# Patient Record
Sex: MALE | Race: WHITE | NOT HISPANIC OR LATINO
[De-identification: names, ages, dates, MRNs, and addresses within clinical notes are randomized per-mention and may not be internally consistent; named-entity substitution may affect disease eponyms.]

---

## 2018-05-02 ENCOUNTER — TRANSCRIPTION ENCOUNTER (OUTPATIENT)
Age: 58
End: 2018-05-02

## 2019-04-19 PROBLEM — Z00.00 ENCOUNTER FOR PREVENTIVE HEALTH EXAMINATION: Status: ACTIVE | Noted: 2019-04-19

## 2019-04-24 ENCOUNTER — RECORD ABSTRACTING (OUTPATIENT)
Age: 59
End: 2019-04-24

## 2019-04-26 ENCOUNTER — APPOINTMENT (OUTPATIENT)
Dept: CARDIOLOGY | Facility: CLINIC | Age: 59
End: 2019-04-26
Payer: COMMERCIAL

## 2019-04-26 ENCOUNTER — NON-APPOINTMENT (OUTPATIENT)
Age: 59
End: 2019-04-26

## 2019-04-26 VITALS
SYSTOLIC BLOOD PRESSURE: 120 MMHG | OXYGEN SATURATION: 98 % | DIASTOLIC BLOOD PRESSURE: 80 MMHG | HEART RATE: 74 BPM | BODY MASS INDEX: 30.48 KG/M2 | WEIGHT: 230 LBS | HEIGHT: 73 IN

## 2019-04-26 DIAGNOSIS — Z78.9 OTHER SPECIFIED HEALTH STATUS: ICD-10-CM

## 2019-04-26 DIAGNOSIS — Z86.39 PERSONAL HISTORY OF OTHER ENDOCRINE, NUTRITIONAL AND METABOLIC DISEASE: ICD-10-CM

## 2019-04-26 DIAGNOSIS — Z87.19 PERSONAL HISTORY OF OTHER DISEASES OF THE DIGESTIVE SYSTEM: ICD-10-CM

## 2019-04-26 PROCEDURE — 99243 OFF/OP CNSLTJ NEW/EST LOW 30: CPT

## 2019-04-26 PROCEDURE — 93000 ELECTROCARDIOGRAM COMPLETE: CPT

## 2019-04-26 NOTE — REASON FOR VISIT
[Consultation] : a consultation regarding [FreeTextEntry2] : [facility] ER evaluation for hypertension and neck pain.  [FreeTextEntry1] : Alexei is a 59-year-old male with history of hypertension, obesity, dyslipidemia, erectile dysfunction, family history of premature CAD.\par \par Patient had elevated /105 and neck pain evaluated [facility] 4/8/19, normal labs, nonischemic EKG,  CT neck left thyroid nodule following up with endocrine.  Patient discharged for cardiology followup.\par \par Patient has dyspnea with moderate exertion. Cardiovascular review of symptoms is negative for exertional chest pain, dyspnea, palpitations, dizziness or syncope.  No PND or orthopnea leg edema.  No bleeding or black stool.  Patient has mild right neck pain "fullness" currently under evaluation with endocrinologist for left thyroid nodule. \par  \par Patient is not exercising.  Patient is walking 10 minutes with exertional chest pain.\par \par EKG April 2019, sinus rhythm NSST\par \par Labs [facility] 4/8/19 normal BMP, TSH, LFTs, magnesium, hemoglobin 18\par \par Noncontrast CT neck 4/8/19 left inferior thyroid hypodense nodule\par \par Adenosine Myoview stress test 4/23/12 normal LVEF, normal perfusion, baseline sinus rhythm, no chest pain\par \par Echocardiogram 4/20/12 LVEF 55% normal diastolic function, trace MR, aortic arch 3.0cm

## 2019-04-26 NOTE — DISCUSSION/SUMMARY
[FreeTextEntry1] : Alexei is a 59-year-old male with medical history detailed above and active medical issues including:\par \par - Dyspnea on exertion, neck pain/fullness, multiple CAD risk factors. Patient will have noninvasive testing with a exercise Myoview stress test to assess for obstructive CAD, exercise-induced arrhythmia,  blood pressure response, 2DEcho for LVEF, wall motion, structural heart disease,  carotid and abdominal ultrasound to assess for obstructive PAD. \par \par - Labile hypertension at BP goal less than 130/80 on Accuretic and diastolic\par \par - Dyslipidemia on Lovasa\par \par - Erectile dysfunction on as needed Viagra\par \par - Reflux GERD on omeprazole\par \par - Left thyroid nodule evaluation with endocrinologist\par \par - Family history of premature CAD\par \par Patient will be seen in cardiology follow-up after noninvasive testing.\par \par Advised patient to follow active lifestyle with regular cardiovascular exercise. Patient educated on lifestyle and diet modification with low sodium low fat diet and avoidance of excessive alcohol. Patient is aware to call with any symptoms or concerns. \par \par Alexei will followup with Dr Puneet Guzman for primary care\par

## 2019-04-26 NOTE — PHYSICAL EXAM
[General Appearance - Well Developed] : well developed [Normal Appearance] : normal appearance [Well Groomed] : well groomed [General Appearance - Well Nourished] : well nourished [No Deformities] : no deformities [General Appearance - In No Acute Distress] : no acute distress [Normal Conjunctiva] : the conjunctiva exhibited no abnormalities [Eyelids - No Xanthelasma] : the eyelids demonstrated no xanthelasmas [Normal Oral Mucosa] : normal oral mucosa [No Oral Pallor] : no oral pallor [No Oral Cyanosis] : no oral cyanosis [Normal Jugular Venous A Waves Present] : normal jugular venous A waves present [Normal Jugular Venous V Waves Present] : normal jugular venous V waves present [No Jugular Venous Cassidy A Waves] : no jugular venous cassidy A waves [Heart Rate And Rhythm] : heart rate and rhythm were normal [Heart Sounds] : normal S1 and S2 [Murmurs] : no murmurs present [Respiration, Rhythm And Depth] : normal respiratory rhythm and effort [Exaggerated Use Of Accessory Muscles For Inspiration] : no accessory muscle use [Auscultation Breath Sounds / Voice Sounds] : lungs were clear to auscultation bilaterally [Abdomen Tenderness] : non-tender [Abdomen Soft] : soft [Abdomen Mass (___ Cm)] : no abdominal mass palpated [Abnormal Walk] : normal gait [Gait - Sufficient For Exercise Testing] : the gait was sufficient for exercise testing [Nail Clubbing] : no clubbing of the fingernails [Cyanosis, Localized] : no localized cyanosis [Petechial Hemorrhages (___cm)] : no petechial hemorrhages [Skin Color & Pigmentation] : normal skin color and pigmentation [] : no rash [No Venous Stasis] : no venous stasis [Skin Lesions] : no skin lesions [No Skin Ulcers] : no skin ulcer [No Xanthoma] : no  xanthoma was observed [Affect] : the affect was normal [Oriented To Time, Place, And Person] : oriented to person, place, and time [Mood] : the mood was normal [No Anxiety] : not feeling anxious [FreeTextEntry1] : mild right neck fullness

## 2019-05-29 ENCOUNTER — APPOINTMENT (OUTPATIENT)
Dept: CARDIOLOGY | Facility: CLINIC | Age: 59
End: 2019-05-29

## 2019-05-31 ENCOUNTER — APPOINTMENT (OUTPATIENT)
Dept: CARDIOLOGY | Facility: CLINIC | Age: 59
End: 2019-05-31

## 2019-06-06 ENCOUNTER — APPOINTMENT (OUTPATIENT)
Dept: CARDIOLOGY | Facility: CLINIC | Age: 59
End: 2019-06-06
Payer: COMMERCIAL

## 2019-06-06 PROCEDURE — 93880 EXTRACRANIAL BILAT STUDY: CPT

## 2019-06-06 PROCEDURE — 93979 VASCULAR STUDY: CPT

## 2019-06-06 PROCEDURE — 93306 TTE W/DOPPLER COMPLETE: CPT

## 2019-06-06 RX ORDER — OMEPRAZOLE 20 MG/1
20 CAPSULE, DELAYED RELEASE ORAL DAILY
Refills: 0 | Status: ACTIVE | COMMUNITY

## 2019-06-07 ENCOUNTER — APPOINTMENT (OUTPATIENT)
Dept: CARDIOLOGY | Facility: CLINIC | Age: 59
End: 2019-06-07
Payer: COMMERCIAL

## 2019-06-07 PROCEDURE — 93015 CV STRESS TEST SUPVJ I&R: CPT

## 2019-06-07 PROCEDURE — A9502: CPT

## 2019-06-07 PROCEDURE — 78452 HT MUSCLE IMAGE SPECT MULT: CPT

## 2019-06-21 ENCOUNTER — APPOINTMENT (OUTPATIENT)
Dept: CARDIOLOGY | Facility: CLINIC | Age: 59
End: 2019-06-21
Payer: COMMERCIAL

## 2019-06-21 VITALS
DIASTOLIC BLOOD PRESSURE: 80 MMHG | HEIGHT: 73 IN | SYSTOLIC BLOOD PRESSURE: 140 MMHG | BODY MASS INDEX: 30.48 KG/M2 | OXYGEN SATURATION: 98 % | WEIGHT: 230 LBS | HEART RATE: 76 BPM

## 2019-06-21 PROCEDURE — 99214 OFFICE O/P EST MOD 30 MIN: CPT

## 2019-06-21 NOTE — PHYSICAL EXAM
[General Appearance - Well Developed] : well developed [Normal Appearance] : normal appearance [Well Groomed] : well groomed [General Appearance - Well Nourished] : well nourished [No Deformities] : no deformities [General Appearance - In No Acute Distress] : no acute distress [Normal Conjunctiva] : the conjunctiva exhibited no abnormalities [Eyelids - No Xanthelasma] : the eyelids demonstrated no xanthelasmas [Normal Oral Mucosa] : normal oral mucosa [No Oral Pallor] : no oral pallor [No Oral Cyanosis] : no oral cyanosis [Normal Jugular Venous A Waves Present] : normal jugular venous A waves present [Normal Jugular Venous V Waves Present] : normal jugular venous V waves present [No Jugular Venous Cassidy A Waves] : no jugular venous cassidy A waves [Respiration, Rhythm And Depth] : normal respiratory rhythm and effort [Exaggerated Use Of Accessory Muscles For Inspiration] : no accessory muscle use [Auscultation Breath Sounds / Voice Sounds] : lungs were clear to auscultation bilaterally [Heart Sounds] : normal S1 and S2 [Heart Rate And Rhythm] : heart rate and rhythm were normal [Murmurs] : no murmurs present [Abdomen Soft] : soft [Abdomen Tenderness] : non-tender [Abdomen Mass (___ Cm)] : no abdominal mass palpated [Gait - Sufficient For Exercise Testing] : the gait was sufficient for exercise testing [Abnormal Walk] : normal gait [Nail Clubbing] : no clubbing of the fingernails [Cyanosis, Localized] : no localized cyanosis [Petechial Hemorrhages (___cm)] : no petechial hemorrhages [Skin Color & Pigmentation] : normal skin color and pigmentation [No Venous Stasis] : no venous stasis [] : no rash [Skin Lesions] : no skin lesions [No Skin Ulcers] : no skin ulcer [No Xanthoma] : no  xanthoma was observed [Oriented To Time, Place, And Person] : oriented to person, place, and time [Mood] : the mood was normal [Affect] : the affect was normal [No Anxiety] : not feeling anxious [FreeTextEntry1] : mild right neck fullness

## 2019-06-21 NOTE — DISCUSSION/SUMMARY
[FreeTextEntry1] : Alexei is a 59-year-old male with medical history detailed above and active medical issues including:\par \par - Patient is seen for preop colonoscopy Dr Reese 6/26/19 [facility] and umbilical hernia repair Dr Brian 7/12/19 [facility] [place] [facility]. Patient is optimized from a cardiovascular perspective and may proceed with surgery.  Patient currently not on anticoagulation or aspirin therapy.  Patient should continue antihypertensive medication Accuretic and Bystolic up to the time of surgery. Please call with any further questions. \par \par - No anginal symptoms, normal perfusion Myoview stress test with normal LVEF June 2019\par \par - Labile hypertension at BP goal less than 130/80 on Accuretic and Bystolic\par \par - Dyslipidemia on Lovasa well  tolerated\par \par - Erectile dysfunction on as needed Viagra\par \par - Reflux GERD on omeprazole\par \par - Left thyroid nodule evaluation with endocrinologist\par \par - Family history of premature CAD\par \par Patient will be seen in cardiology follow-up 6 months. Current cardiac medications remain unchanged. Repeat labs will be ordered with PMD.\par \par Advised patient to follow active lifestyle with regular cardiovascular exercise. Patient educated on lifestyle and diet modification with low sodium low fat diet and avoidance of excessive alcohol. Patient is aware to call with any symptoms or concerns. \par \par Alexei will followup with Dr Puneet Guzman for primary care\par

## 2019-06-21 NOTE — REASON FOR VISIT
[Consultation] : a consultation regarding [FreeTextEntry1] : Alexei is a 59-year-old male with history of hypertension, obesity, dyslipidemia, erectile dysfunction, family history of premature CAD.\par \par Cardiovascular review of symptoms is negative for exertional chest pain, dyspnea, palpitations, dizziness or syncope.  No PND or orthopnea leg edema.  No bleeding or black stool.\par \par Patient is walking 15 minutes with exertional chest pain. \par \par Patient had elevated /105 and neck pain evaluated [facility] 4/8/19, normal labs, nonischemic EKG,  CT neck left thyroid nodule following up with endocrine.  Patient discharged for cardiology followup.\par \par Lexascan Myoview stress test June 2019 LVEF 58%, normal perfusion, diaphragm attenuation seen, no ischemic EKG response, no chest pain, baseline sinus rhythm T-wave inversion V3\par \par Echocardiogram June 2019, LVEF 53%, mild diastolic dysfunction, mild MR and TR, normal RVSP\par \par Carotid and abdominal ultrasound June 2019 mild nonobstructive plaque,\par \par EKG April 2019, sinus rhythm NSST\par \par Labs [facility] 4/8/19 normal BMP, TSH, LFTs, magnesium, hemoglobin 18\par \par Noncontrast CT neck 4/8/19 left inferior thyroid hypodense nodule\par \par Adenosine Myoview stress test 4/23/12 normal LVEF, normal perfusion, baseline sinus rhythm, no chest pain\par \par Echocardiogram 4/20/12 LVEF 55% normal diastolic function, trace MR, aortic arch 3.0cm [FreeTextEntry2] : preop colonoscopy Dr Reese 6/26/19 [facility] and umbilical hernia repair Dr Brian 7/12/19 [facility], noninvasive testing for LA

## 2019-06-28 ENCOUNTER — OUTPATIENT (OUTPATIENT)
Dept: OUTPATIENT SERVICES | Facility: HOSPITAL | Age: 59
LOS: 1 days | End: 2019-06-28

## 2019-07-12 ENCOUNTER — OUTPATIENT (OUTPATIENT)
Dept: OUTPATIENT SERVICES | Facility: HOSPITAL | Age: 59
LOS: 1 days | End: 2019-07-12

## 2019-10-22 ENCOUNTER — TRANSCRIPTION ENCOUNTER (OUTPATIENT)
Age: 59
End: 2019-10-22

## 2019-10-24 ENCOUNTER — TRANSCRIPTION ENCOUNTER (OUTPATIENT)
Age: 59
End: 2019-10-24

## 2020-01-12 ENCOUNTER — TRANSCRIPTION ENCOUNTER (OUTPATIENT)
Age: 60
End: 2020-01-12

## 2023-01-03 ENCOUNTER — APPOINTMENT (OUTPATIENT)
Dept: CARDIOLOGY | Facility: CLINIC | Age: 63
End: 2023-01-03
Payer: COMMERCIAL

## 2023-01-03 ENCOUNTER — NON-APPOINTMENT (OUTPATIENT)
Age: 63
End: 2023-01-03

## 2023-01-03 VITALS
HEIGHT: 73 IN | OXYGEN SATURATION: 98 % | DIASTOLIC BLOOD PRESSURE: 82 MMHG | TEMPERATURE: 98.2 F | HEART RATE: 72 BPM | BODY MASS INDEX: 30.62 KG/M2 | SYSTOLIC BLOOD PRESSURE: 128 MMHG | WEIGHT: 231 LBS

## 2023-01-03 PROCEDURE — 99204 OFFICE O/P NEW MOD 45 MIN: CPT

## 2023-01-03 NOTE — REASON FOR VISIT
[Other: ____] : [unfilled] [FreeTextEntry1] : Alexei is a 62-year-old male with history of hypertension, obesity, dyslipidemia, DM2, erectile dysfunction, family history of premature CAD.\par \par Patient has dyspnea with moderate exertion.  Cardiovascular review of symptoms is negative for exertional chest pain, palpitations, dizziness or syncope.  No PND or orthopnea leg edema.  No bleeding or black stool.\par \par No exercise routine.  Patient is walking 15 minutes on occasion.\par \par Patient had been on Accupril recently recalled and subsequently discontinued.  Patient will continue Bystolic 20 Mg daily, HCTZ 12.5 Mg daily with follow-up home BPs to confirm at guideline goal.\par \par Lexascan Myoview stress test June 2019 LVEF 58%, normal perfusion, diaphragm attenuation seen, no ischemic EKG response, no chest pain, baseline sinus rhythm T-wave inversion V3\par \par Echocardiogram June 2019, LVEF 53%, mild diastolic dysfunction, mild MR and TR, normal RVSP\par \par Carotid and abdominal ultrasound June 2019 mild nonobstructive plaque,\par \par EKG April 2019, sinus rhythm NSST\par \par Labs [facility] 4/8/19 normal BMP, TSH, LFTs, magnesium, hemoglobin 18\par \par Noncontrast CT neck 4/8/19 left inferior thyroid hypodense nodule\par \par Adenosine Myoview stress test 4/23/12 normal LVEF, normal perfusion, baseline sinus rhythm, no chest pain\par \par Echocardiogram 4/20/12 LVEF 55% normal diastolic function, trace MR, aortic arch 3.0cm

## 2023-01-03 NOTE — DISCUSSION/SUMMARY
[FreeTextEntry1] : Alexei is a 59-year-old male with medical history detailed above and active medical issues including:\par \par - Dyspnea on exertion, multiple CAD risk factors.  Coronary CTA and coronary calcium score ordered to access for obstructive CAD and risk stratification.\par \par - Labile hypertension. Patient had been on Accupril recently recalled and subsequently discontinued.  Patient will continue Bystolic 20 Mg daily, HCTZ 12.5 Mg daily with follow-up home BPs to confirm at guideline goal.\par \par - Dyslipidemia,  labs Dec 2022 on Lovasa statin started by PCP, repeat labs with PCP. \par \par - Erectile dysfunction on as needed Viagra\par \par - Reflux GERD on omeprazole\par \par - Left thyroid nodule evaluation with endocrinologist\par \par - Family history of premature CAD\par \par Patient will be seen in cardiology follow-up after noninvasive test. Current cardiac medications remain unchanged. Repeat labs will be ordered with PMD.\par \par Advised patient to follow active lifestyle with regular cardiovascular exercise. Patient educated on lifestyle and diet modification with low sodium low fat diet and avoidance of excessive alcohol. Patient is aware to call with any symptoms or concerns. \par \par Alexei will followup with Dr. Usha Addison for primary care\par

## 2023-01-03 NOTE — PHYSICAL EXAM
[General Appearance - Well Developed] : well developed [Normal Appearance] : normal appearance [Well Groomed] : well groomed [General Appearance - Well Nourished] : well nourished [No Deformities] : no deformities [General Appearance - In No Acute Distress] : no acute distress [Normal Conjunctiva] : the conjunctiva exhibited no abnormalities [Eyelids - No Xanthelasma] : the eyelids demonstrated no xanthelasmas [Normal Oral Mucosa] : normal oral mucosa [No Oral Pallor] : no oral pallor [No Oral Cyanosis] : no oral cyanosis [Normal Jugular Venous A Waves Present] : normal jugular venous A waves present [Normal Jugular Venous V Waves Present] : normal jugular venous V waves present [No Jugular Venous Cassidy A Waves] : no jugular venous cassidy A waves [FreeTextEntry1] : mild right neck fullness [Respiration, Rhythm And Depth] : normal respiratory rhythm and effort [Exaggerated Use Of Accessory Muscles For Inspiration] : no accessory muscle use [Auscultation Breath Sounds / Voice Sounds] : lungs were clear to auscultation bilaterally [Heart Rate And Rhythm] : heart rate and rhythm were normal [Heart Sounds] : normal S1 and S2 [Murmurs] : no murmurs present [Abdomen Soft] : soft [Abdomen Tenderness] : non-tender [Abdomen Mass (___ Cm)] : no abdominal mass palpated [Abnormal Walk] : normal gait [Gait - Sufficient For Exercise Testing] : the gait was sufficient for exercise testing [Nail Clubbing] : no clubbing of the fingernails [Cyanosis, Localized] : no localized cyanosis [Petechial Hemorrhages (___cm)] : no petechial hemorrhages [Skin Color & Pigmentation] : normal skin color and pigmentation [] : no rash [No Venous Stasis] : no venous stasis [Skin Lesions] : no skin lesions [No Skin Ulcers] : no skin ulcer [No Xanthoma] : no  xanthoma was observed [Oriented To Time, Place, And Person] : oriented to person, place, and time [Affect] : the affect was normal [Mood] : the mood was normal [No Anxiety] : not feeling anxious

## 2023-01-11 RX ORDER — HYDROCHLOROTHIAZIDE 25 MG/1
25 TABLET ORAL DAILY
Qty: 90 | Refills: 1 | Status: ACTIVE | COMMUNITY
Start: 1900-01-01 | End: 1900-01-01

## 2023-01-11 RX ORDER — QUINAPRIL AND HYDROCHLOROTHIAZIDE 12.5; 2 MG/1; MG/1
20-12.5 TABLET, FILM COATED ORAL DAILY
Qty: 60 | Refills: 0 | Status: DISCONTINUED | COMMUNITY
End: 2023-01-11

## 2023-01-12 ENCOUNTER — NON-APPOINTMENT (OUTPATIENT)
Age: 63
End: 2023-01-12

## 2023-01-17 ENCOUNTER — APPOINTMENT (OUTPATIENT)
Dept: CARDIOLOGY | Facility: CLINIC | Age: 63
End: 2023-01-17
Payer: COMMERCIAL

## 2023-01-17 ENCOUNTER — NON-APPOINTMENT (OUTPATIENT)
Age: 63
End: 2023-01-17

## 2023-01-17 PROCEDURE — 93979 VASCULAR STUDY: CPT

## 2023-01-17 PROCEDURE — 93880 EXTRACRANIAL BILAT STUDY: CPT

## 2023-01-17 PROCEDURE — 93306 TTE W/DOPPLER COMPLETE: CPT

## 2023-01-17 RX ORDER — NEBIVOLOL 20 MG/1
20 TABLET ORAL
Qty: 145 | Refills: 0 | Status: ACTIVE | COMMUNITY
Start: 1900-01-01 | End: 1900-01-01

## 2023-01-18 ENCOUNTER — APPOINTMENT (OUTPATIENT)
Dept: CARDIOLOGY | Facility: CLINIC | Age: 63
End: 2023-01-18
Payer: COMMERCIAL

## 2023-01-18 VITALS
DIASTOLIC BLOOD PRESSURE: 68 MMHG | SYSTOLIC BLOOD PRESSURE: 120 MMHG | HEIGHT: 73 IN | OXYGEN SATURATION: 98 % | TEMPERATURE: 98 F | WEIGHT: 230 LBS | BODY MASS INDEX: 30.48 KG/M2 | HEART RATE: 76 BPM

## 2023-01-18 PROCEDURE — 99215 OFFICE O/P EST HI 40 MIN: CPT

## 2023-01-18 RX ORDER — OMEGA-3-ACID ETHYL ESTERS 1 G/1
1 CAPSULE, LIQUID FILLED ORAL DAILY
Refills: 0 | Status: ACTIVE | COMMUNITY

## 2023-01-18 RX ORDER — ROSUVASTATIN CALCIUM 10 MG/1
10 TABLET, FILM COATED ORAL
Qty: 90 | Refills: 1 | Status: ACTIVE | COMMUNITY
Start: 2023-01-18 | End: 1900-01-01

## 2023-01-18 NOTE — PHYSICAL EXAM
[General Appearance - Well Developed] : well developed [Normal Appearance] : normal appearance [Well Groomed] : well groomed [General Appearance - Well Nourished] : well nourished [No Deformities] : no deformities [General Appearance - In No Acute Distress] : no acute distress [Normal Conjunctiva] : the conjunctiva exhibited no abnormalities [Eyelids - No Xanthelasma] : the eyelids demonstrated no xanthelasmas [Normal Oral Mucosa] : normal oral mucosa [No Oral Pallor] : no oral pallor [No Oral Cyanosis] : no oral cyanosis [Normal Jugular Venous A Waves Present] : normal jugular venous A waves present [Normal Jugular Venous V Waves Present] : normal jugular venous V waves present [No Jugular Venous Cassidy A Waves] : no jugular venous cassidy A waves [Respiration, Rhythm And Depth] : normal respiratory rhythm and effort [Exaggerated Use Of Accessory Muscles For Inspiration] : no accessory muscle use [Auscultation Breath Sounds / Voice Sounds] : lungs were clear to auscultation bilaterally [Heart Rate And Rhythm] : heart rate and rhythm were normal [Heart Sounds] : normal S1 and S2 [Murmurs] : no murmurs present [Abdomen Soft] : soft [Abdomen Tenderness] : non-tender [Abdomen Mass (___ Cm)] : no abdominal mass palpated [Abnormal Walk] : normal gait [Gait - Sufficient For Exercise Testing] : the gait was sufficient for exercise testing [Nail Clubbing] : no clubbing of the fingernails [Cyanosis, Localized] : no localized cyanosis [Petechial Hemorrhages (___cm)] : no petechial hemorrhages [Skin Color & Pigmentation] : normal skin color and pigmentation [] : no rash [No Venous Stasis] : no venous stasis [Skin Lesions] : no skin lesions [No Skin Ulcers] : no skin ulcer [No Xanthoma] : no  xanthoma was observed [Oriented To Time, Place, And Person] : oriented to person, place, and time [Affect] : the affect was normal [Mood] : the mood was normal [No Anxiety] : not feeling anxious [FreeTextEntry1] : mild right neck fullness

## 2023-01-18 NOTE — DISCUSSION/SUMMARY
[FreeTextEntry1] : Alexei is a 62-year-old male with medical history detailed above and active medical issues including:\par \par - Dyspnea on exertion, multiple CAD risk factors.  Coronary CTA and coronary calcium score ordered to access for obstructive CAD and risk stratification with telehealth followup.\par \par - Labile hypertension. Patient had been on Accupril recently recalled and subsequently discontinued.  Patient will continue Bystolic 20 Mg daily, HCTZ increased to 25 Mg daily with follow-up home average resting BPs to confirm at guideline goal.  \par \par - Dyslipidemia,  labs Dec 2022 on Lovasa, start Crestor 10 Mg daily with repeat labs ordered\par \par - Erectile dysfunction on as needed Viagra\par \par - Reflux GERD on omeprazole\par \par - Left thyroid nodule evaluation with endocrinologist\par \par - Family history of premature CAD\par \par TEB follow-up after coronary CTA.  Patient will be seen in cardiology follow-up 1 year at patient request.  \par \par Advised patient to follow active lifestyle with regular cardiovascular exercise. Patient educated on lifestyle and diet modification with low sodium low fat diet and avoidance of excessive alcohol. Patient is aware to call with any symptoms or concerns. \par \par Alexei will followup with Dr. Usha Addison for primary care\par

## 2023-01-18 NOTE — REASON FOR VISIT
[Other: ____] : [unfilled] [FreeTextEntry1] : Alexei is a 62-year-old male with history of hypertension, obesity, dyslipidemia, DM2, erectile dysfunction, family history of premature CAD.\par \par Patient has dyspnea with moderate exertion.  Cardiovascular review of symptoms is negative for exertional chest pain, palpitations, dizziness or syncope.  No PND or orthopnea leg edema.  No bleeding or black stool.\par \par No exercise routine.  Patient is walking 15 minutes on occasion.\par \par Patient had been on Accupril recently recalled and subsequently discontinued.  Patient will continue Bystolic 20 Mg daily, HCTZ 25 Mg daily with follow-up home BPs to confirm at guideline goal.\par \par Echocardiogram Jan 2023 LVEF 55 to 60%, mild MR and PI.\par \par Carotid and abdominal ultrasound Jan 2023, mild nonobstructive plaque, normal abdominal aortic size. \par \par Lexascan Myoview stress test June 2019 LVEF 58%, normal perfusion, diaphragm attenuation seen, no ischemic EKG response, no chest pain, baseline sinus rhythm T-wave inversion V3\par \par Echocardiogram June 2019, LVEF 53%, mild diastolic dysfunction, mild MR and TR, normal RVSP\par \par Carotid and abdominal ultrasound June 2019 mild nonobstructive plaque,\par \par EKG April 2019, sinus rhythm NSST\par \par Labs [facility] 4/8/19 normal BMP, TSH, LFTs, magnesium, hemoglobin 18\par \par Noncontrast CT neck 4/8/19 left inferior thyroid hypodense nodule\par \par Adenosine Myoview stress test 4/23/12 normal LVEF, normal perfusion, baseline sinus rhythm, no chest pain\par \par Echocardiogram 4/20/12 LVEF 55% normal diastolic function, trace MR, aortic arch 3.0cm

## 2023-09-08 ENCOUNTER — NON-APPOINTMENT (OUTPATIENT)
Age: 63
End: 2023-09-08

## 2024-01-17 PROBLEM — I10 HYPERTENSION, UNSPECIFIED TYPE: Status: ACTIVE | Noted: 2019-04-24

## 2024-01-17 PROBLEM — M54.2 NECK PAIN: Status: ACTIVE | Noted: 2019-04-26

## 2024-01-17 PROBLEM — E04.1 THYROID NODULE: Status: ACTIVE | Noted: 2019-04-26

## 2024-01-17 PROBLEM — R06.09 DOE (DYSPNEA ON EXERTION): Status: ACTIVE | Noted: 2019-04-26

## 2024-01-17 PROBLEM — E78.2 MIXED HYPERLIPIDEMIA: Status: ACTIVE | Noted: 2023-01-03

## 2024-01-17 NOTE — DISCUSSION/SUMMARY
[FreeTextEntry1] : Alexei is a 63-year-old male with medical history detailed above and active medical issues including:  - Dyspnea on exertion, multiple CAD risk factors.  Coronary CTA and coronary calcium score ordered to access for obstructive CAD and risk stratification with telehealth followup.  - Labile hypertension. Patient had been on Accupril recently recalled and subsequently discontinued.  Patient will continue Bystolic 20 Mg daily, HCTZ increased to 25 Mg daily with follow-up home average resting BPs to confirm at guideline goal.    - Dyslipidemia,  labs Dec 2022 on Lovasa, start Crestor 10 Mg daily with repeat labs ordered  - Erectile dysfunction on as needed Viagra  - Reflux GERD on omeprazole  - Left thyroid nodule evaluation with endocrinologist  - Family history of premature CAD  TEB follow-up after coronary CTA.  Patient will be seen in cardiology follow-up 1 year at patient request.    Advised patient to follow active lifestyle with regular cardiovascular exercise. Patient educated on lifestyle and diet modification with low sodium low fat diet and avoidance of excessive alcohol. Patient is aware to call with any symptoms or concerns.   Alexei will followup with Dr. Usha Addison for primary care

## 2024-01-17 NOTE — REASON FOR VISIT
[Other: ____] : [unfilled] [FreeTextEntry1] : Alexei is a 63-year-old male with history of hypertension, obesity, dyslipidemia, DM2, erectile dysfunction, family history of premature CAD.  Patient has dyspnea with moderate exertion.  Cardiovascular review of symptoms is negative for exertional chest pain, palpitations, dizziness or syncope.  No PND or orthopnea leg edema.  No bleeding or black stool.  No exercise routine.  Patient is walking 15 minutes on occasion.  Patient had been on Accupril recently recalled and subsequently discontinued.  Patient will continue Bystolic 20 Mg daily, HCTZ 25 Mg daily with follow-up home BPs to confirm at guideline goal.  Echocardiogram Jan 2023 LVEF 55 to 60%, mild MR and PI.  Carotid and abdominal ultrasound Jan 2023, mild nonobstructive plaque, normal abdominal aortic size.   Lexascan Myoview stress test June 2019 LVEF 58%, normal perfusion, diaphragm attenuation seen, no ischemic EKG response, no chest pain, baseline sinus rhythm T-wave inversion V3  Echocardiogram June 2019, LVEF 53%, mild diastolic dysfunction, mild MR and TR, normal RVSP  Carotid and abdominal ultrasound June 2019 mild nonobstructive plaque,  EKG April 2019, sinus rhythm NSST  Labs [facility] 4/8/19 normal BMP, TSH, LFTs, magnesium, hemoglobin 18  Noncontrast CT neck 4/8/19 left inferior thyroid hypodense nodule  Adenosine Myoview stress test 4/23/12 normal LVEF, normal perfusion, baseline sinus rhythm, no chest pain  Echocardiogram 4/20/12 LVEF 55% normal diastolic function, trace MR, aortic arch 3.0cm

## 2024-01-23 ENCOUNTER — APPOINTMENT (OUTPATIENT)
Dept: CARDIOLOGY | Facility: CLINIC | Age: 64
End: 2024-01-23

## 2024-01-23 DIAGNOSIS — M54.2 CERVICALGIA: ICD-10-CM

## 2024-01-23 DIAGNOSIS — R06.09 OTHER FORMS OF DYSPNEA: ICD-10-CM

## 2024-01-23 DIAGNOSIS — E04.1 NONTOXIC SINGLE THYROID NODULE: ICD-10-CM

## 2024-01-23 DIAGNOSIS — I10 ESSENTIAL (PRIMARY) HYPERTENSION: ICD-10-CM

## 2024-01-23 DIAGNOSIS — E78.2 MIXED HYPERLIPIDEMIA: ICD-10-CM

## 2025-09-15 ENCOUNTER — NON-APPOINTMENT (OUTPATIENT)
Age: 65
End: 2025-09-15

## 2025-09-15 ENCOUNTER — APPOINTMENT (OUTPATIENT)
Dept: HEMATOLOGY ONCOLOGY | Facility: CLINIC | Age: 65
End: 2025-09-15
Payer: COMMERCIAL

## 2025-09-15 ENCOUNTER — RESULT REVIEW (OUTPATIENT)
Age: 65
End: 2025-09-15

## 2025-09-15 VITALS
TEMPERATURE: 97.6 F | HEART RATE: 76 BPM | HEIGHT: 73 IN | SYSTOLIC BLOOD PRESSURE: 146 MMHG | OXYGEN SATURATION: 98 % | BODY MASS INDEX: 30.48 KG/M2 | WEIGHT: 230 LBS | DIASTOLIC BLOOD PRESSURE: 90 MMHG

## 2025-09-15 DIAGNOSIS — D75.1 SECONDARY POLYCYTHEMIA: ICD-10-CM

## 2025-09-15 DIAGNOSIS — I82.462 LT CALF MUSCULAR VEIN ACUTE EMBOLISM AND THROMBOSIS: ICD-10-CM

## 2025-09-15 PROCEDURE — 99204 OFFICE O/P NEW MOD 45 MIN: CPT

## 2025-09-15 PROCEDURE — G2211 COMPLEX E/M VISIT ADD ON: CPT | Mod: NC

## 2025-09-15 RX ORDER — SITAGLIPTIN 100 MG/1
TABLET, FILM COATED ORAL
Refills: 0 | Status: ACTIVE | COMMUNITY

## 2025-09-15 RX ORDER — APIXABAN 5 MG/1
5 TABLET, FILM COATED ORAL
Qty: 60 | Refills: 3 | Status: ACTIVE | COMMUNITY
Start: 1900-01-01 | End: 1900-01-01

## 2025-09-17 LAB — JAK2 GENE MUT ANL BLD/T: NORMAL

## 2025-09-18 LAB — T(9;22)(ABL1,BCR)/CONTROL BLD/T: NORMAL

## 2025-09-19 LAB
GENE XXX MUT ANL BLD/T: NORMAL
MPL EXON 10 MUTATION: NORMAL